# Patient Record
Sex: FEMALE | URBAN - METROPOLITAN AREA
[De-identification: names, ages, dates, MRNs, and addresses within clinical notes are randomized per-mention and may not be internally consistent; named-entity substitution may affect disease eponyms.]

---

## 2022-07-22 ENCOUNTER — OFFICE VISIT (OUTPATIENT)
Dept: URBAN - METROPOLITAN AREA CLINIC 6 | Facility: CLINIC | Age: 72
End: 2022-07-22
Payer: MEDICARE

## 2022-07-22 DIAGNOSIS — E11.9 TYPE 2 DIABETES MELLITUS WITHOUT COMPLICATIONS: Primary | ICD-10-CM

## 2022-07-22 PROCEDURE — 92014 COMPRE OPH EXAM EST PT 1/>: CPT | Performed by: OPHTHALMOLOGY

## 2022-07-22 RX ORDER — PANTOPRAZOLE SODIUM 40 MG/1
40 MG TABLET, DELAYED RELEASE ORAL AS DIRECTED
Refills: 0 | Status: ACTIVE
Start: 2022-07-22

## 2022-07-22 RX ORDER — LEVOTHYROXINE SODIUM 25 UG/1
TABLET ORAL AS DIRECTED
Refills: 0 | Status: ACTIVE
Start: 2022-07-22

## 2022-07-22 ASSESSMENT — INTRAOCULAR PRESSURE
OS: 12
OD: 11

## 2022-07-22 NOTE — IMPRESSION/PLAN
Impression: Type 2 diabetes mellitus without complications: P89.5. Plan: Diabetes Mellitus Type II without signs of diabetic retinopathy either eye- Discussed the pathophysiology of diabetes and its effect on the eye. Stressed the importance of strong glucose control. Advised of importance of at least annual dilated examinations, and to contact us immediately for any problems or concerns.